# Patient Record
Sex: FEMALE | Race: WHITE | NOT HISPANIC OR LATINO | Employment: FULL TIME | URBAN - METROPOLITAN AREA
[De-identification: names, ages, dates, MRNs, and addresses within clinical notes are randomized per-mention and may not be internally consistent; named-entity substitution may affect disease eponyms.]

---

## 2017-02-03 ENCOUNTER — HISTORICAL (OUTPATIENT)
Dept: RADIOLOGY | Facility: HOSPITAL | Age: 57
End: 2017-02-03

## 2017-09-12 ENCOUNTER — HISTORICAL (OUTPATIENT)
Dept: RADIOLOGY | Facility: HOSPITAL | Age: 57
End: 2017-09-12

## 2018-09-27 ENCOUNTER — HISTORICAL (OUTPATIENT)
Dept: RADIOLOGY | Facility: HOSPITAL | Age: 58
End: 2018-09-27

## 2019-04-01 ENCOUNTER — HISTORICAL (OUTPATIENT)
Dept: LAB | Facility: HOSPITAL | Age: 59
End: 2019-04-01

## 2019-04-01 LAB
CHOLEST SERPL-MCNC: 166 MG/DL (ref 140–200)
CHOLEST/HDLC SERPL: 3 MG/DL (ref 0–8)
EST. AVERAGE GLUCOSE BLD GHB EST-MCNC: 137 MG/DL
FOLATE SERPL-MCNC: 18.9 NG/ML (ref 8.6–58.9)
HBA1C MFR BLD: 6.4 % (ref 4.8–6)
HDLC SERPL-MCNC: 50 MG/DL (ref 35–59)
LDLC SERPL CALC-MCNC: 107 MG/DL (ref 100–129)
TESTOST SERPL-MCNC: 31 NG/DL (ref 14–76)
TRIGL SERPL-MCNC: 111 MG/DL (ref 35–150)
TSH SERPL-ACNC: 2.13 MIU/ML (ref 0.36–3.74)
VIT B12 SERPL-MCNC: 558 PG/ML (ref 193–986)
VLDLC SERPL CALC-MCNC: 22 MG/DL

## 2019-11-11 ENCOUNTER — HISTORICAL (OUTPATIENT)
Dept: RADIOLOGY | Facility: HOSPITAL | Age: 59
End: 2019-11-11

## 2020-04-08 ENCOUNTER — HOSPITAL ENCOUNTER (OUTPATIENT)
Dept: MEDSURG UNIT | Facility: HOSPITAL | Age: 60
End: 2020-04-09
Attending: INTERNAL MEDICINE | Admitting: INTERNAL MEDICINE

## 2020-04-08 LAB
ABS NEUT (OLG): 2.29 X10(3)/MCL (ref 2.1–9.2)
ALBUMIN SERPL-MCNC: 4.3 GM/DL (ref 3.5–5)
ALBUMIN/GLOB SERPL: 1.2 RATIO (ref 1.1–2)
ALP SERPL-CCNC: 57 UNIT/L (ref 40–150)
ALT SERPL-CCNC: 44 UNIT/L (ref 0–55)
APPEARANCE, UA: CLEAR
AST SERPL-CCNC: 39 UNIT/L (ref 5–34)
BACTERIA SPEC CULT: NORMAL /HPF
BASOPHILS # BLD AUTO: 0 X10(3)/MCL (ref 0–0.2)
BASOPHILS NFR BLD AUTO: 1 %
BILIRUB SERPL-MCNC: 0.5 MG/DL
BILIRUB UR QL STRIP: NEGATIVE
BILIRUBIN DIRECT+TOT PNL SERPL-MCNC: 0.1 MG/DL (ref 0–0.5)
BILIRUBIN DIRECT+TOT PNL SERPL-MCNC: 0.4 MG/DL (ref 0–0.8)
BNP BLD-MCNC: <15 PG/ML (ref 0–100)
BUN SERPL-MCNC: 17 MG/DL (ref 9.8–20.1)
CALCIUM SERPL-MCNC: 9.2 MG/DL (ref 8.4–10.2)
CHLORIDE SERPL-SCNC: 102 MMOL/L (ref 98–107)
CO2 SERPL-SCNC: 27 MMOL/L (ref 22–29)
COLOR UR: YELLOW
CREAT SERPL-MCNC: 0.98 MG/DL (ref 0.55–1.02)
D DIMER PPP IA.FEU-MCNC: 348 NG/ML FEU (ref 0–500)
EOSINOPHIL # BLD AUTO: 0 X10(3)/MCL (ref 0–0.9)
EOSINOPHIL NFR BLD AUTO: 1 %
ERYTHROCYTE [DISTWIDTH] IN BLOOD BY AUTOMATED COUNT: 13.6 % (ref 11.5–17)
ERYTHROCYTE [SEDIMENTATION RATE] IN BLOOD: 2 MM/HR (ref 0–20)
GLOBULIN SER-MCNC: 3.6 GM/DL (ref 2.4–3.5)
GLUCOSE (UA): NEGATIVE
GLUCOSE SERPL-MCNC: 115 MG/DL (ref 74–100)
HCT VFR BLD AUTO: 49.6 % (ref 37–47)
HGB BLD-MCNC: 16.1 GM/DL (ref 12–16)
HGB UR QL STRIP: NEGATIVE
KETONES UR QL STRIP: NEGATIVE
LDH SERPL-CCNC: 386 UNIT/L (ref 140–271)
LEUKOCYTE ESTERASE UR QL STRIP: NEGATIVE
LYMPHOCYTES # BLD AUTO: 2 X10(3)/MCL (ref 0.6–4.6)
LYMPHOCYTES NFR BLD AUTO: 42 %
MAGNESIUM SERPL-MCNC: 1.96 MG/DL (ref 1.6–2.6)
MCH RBC QN AUTO: 31 PG (ref 27–31)
MCHC RBC AUTO-ENTMCNC: 32.5 GM/DL (ref 33–36)
MCV RBC AUTO: 95.6 FL (ref 80–94)
MONOCYTES # BLD AUTO: 0.3 X10(3)/MCL (ref 0.1–1.3)
MONOCYTES NFR BLD AUTO: 7 %
NEUTROPHILS # BLD AUTO: 2.29 X10(3)/MCL (ref 2.1–9.2)
NEUTROPHILS NFR BLD AUTO: 49 %
NITRITE UR QL STRIP: NEGATIVE
PH UR STRIP: 5 [PH] (ref 5–9)
PHOSPHATE SERPL-MCNC: 3.5 MG/DL (ref 2.3–4.7)
PLATELET # BLD AUTO: 237 X10(3)/MCL (ref 130–400)
PMV BLD AUTO: 11 FL (ref 9.4–12.4)
POC TROPONIN: 0.01 NG/ML (ref 0–0.08)
POTASSIUM SERPL-SCNC: 5 MMOL/L (ref 3.5–5.1)
PROT SERPL-MCNC: 7.9 GM/DL (ref 6.4–8.3)
PROT UR QL STRIP: NEGATIVE
RBC # BLD AUTO: 5.19 X10(6)/MCL (ref 4.2–5.4)
RBC #/AREA URNS HPF: NORMAL /[HPF]
SODIUM SERPL-SCNC: 139 MMOL/L (ref 136–145)
SP GR UR STRIP: 1.03 (ref 1–1.03)
SQUAMOUS EPITHELIAL, UA: NORMAL
TROPONIN I SERPL-MCNC: 0 NG/ML (ref 0–0.04)
UROBILINOGEN UR STRIP-ACNC: 0.2
WBC # SPEC AUTO: 4.7 X10(3)/MCL (ref 4.5–11.5)
WBC #/AREA URNS HPF: NORMAL /[HPF]

## 2020-04-09 LAB
ABS NEUT (OLG): 1.56 X10(3)/MCL (ref 2.1–9.2)
ALBUMIN SERPL-MCNC: 3.7 GM/DL (ref 3.5–5)
ALBUMIN/GLOB SERPL: 1.3 RATIO (ref 1.1–2)
ALP SERPL-CCNC: 46 UNIT/L (ref 40–150)
ALT SERPL-CCNC: 38 UNIT/L (ref 0–55)
AST SERPL-CCNC: 30 UNIT/L (ref 5–34)
BASOPHILS # BLD AUTO: 0 X10(3)/MCL (ref 0–0.2)
BASOPHILS NFR BLD AUTO: 0 %
BILIRUB SERPL-MCNC: 0.4 MG/DL
BILIRUBIN DIRECT+TOT PNL SERPL-MCNC: 0.2 MG/DL (ref 0–0.5)
BILIRUBIN DIRECT+TOT PNL SERPL-MCNC: 0.2 MG/DL (ref 0–0.8)
BUN SERPL-MCNC: 14 MG/DL (ref 9.8–20.1)
CALCIUM SERPL-MCNC: 8.4 MG/DL (ref 8.4–10.2)
CHLORIDE SERPL-SCNC: 105 MMOL/L (ref 98–107)
CO2 SERPL-SCNC: 27 MMOL/L (ref 22–29)
CREAT SERPL-MCNC: 0.79 MG/DL (ref 0.55–1.02)
EOSINOPHIL # BLD AUTO: 0 X10(3)/MCL (ref 0–0.9)
EOSINOPHIL NFR BLD AUTO: 1 %
ERYTHROCYTE [DISTWIDTH] IN BLOOD BY AUTOMATED COUNT: 13.6 % (ref 11.5–17)
GLOBULIN SER-MCNC: 2.8 GM/DL (ref 2.4–3.5)
GLUCOSE SERPL-MCNC: 111 MG/DL (ref 74–100)
HCT VFR BLD AUTO: 45.5 % (ref 37–47)
HGB BLD-MCNC: 14.6 GM/DL (ref 12–16)
LYMPHOCYTES # BLD AUTO: 2.2 X10(3)/MCL (ref 0.6–4.6)
LYMPHOCYTES NFR BLD AUTO: 53 %
MCH RBC QN AUTO: 30.6 PG (ref 27–31)
MCHC RBC AUTO-ENTMCNC: 32.1 GM/DL (ref 33–36)
MCV RBC AUTO: 95.4 FL (ref 80–94)
MONOCYTES # BLD AUTO: 0.4 X10(3)/MCL (ref 0.1–1.3)
MONOCYTES NFR BLD AUTO: 8 %
NEUTROPHILS # BLD AUTO: 1.56 X10(3)/MCL (ref 2.1–9.2)
NEUTROPHILS NFR BLD AUTO: 37 %
PLATELET # BLD AUTO: 186 X10(3)/MCL (ref 130–400)
PMV BLD AUTO: 11.2 FL (ref 9.4–12.4)
POTASSIUM SERPL-SCNC: 4.2 MMOL/L (ref 3.5–5.1)
PROT SERPL-MCNC: 6.5 GM/DL (ref 6.4–8.3)
RBC # BLD AUTO: 4.77 X10(6)/MCL (ref 4.2–5.4)
SODIUM SERPL-SCNC: 141 MMOL/L (ref 136–145)
WBC # SPEC AUTO: 4.2 X10(3)/MCL (ref 4.5–11.5)

## 2021-03-18 ENCOUNTER — HISTORICAL (OUTPATIENT)
Dept: RADIOLOGY | Facility: HOSPITAL | Age: 61
End: 2021-03-18

## 2021-05-31 ENCOUNTER — HISTORICAL (OUTPATIENT)
Dept: LAB | Facility: HOSPITAL | Age: 61
End: 2021-05-31

## 2021-05-31 LAB
ABS NEUT (OLG): 2.8 X10(3)/MCL (ref 1.5–6.9)
ALBUMIN SERPL-MCNC: 4 GM/DL (ref 3.5–5)
ALBUMIN/GLOB SERPL: 1.1 RATIO (ref 1.1–2)
ALP SERPL-CCNC: 60 UNIT/L (ref 40–150)
ALT SERPL-CCNC: 53 UNIT/L (ref 0–55)
AST SERPL-CCNC: 33 UNIT/L (ref 5–34)
BASOPHILS # BLD AUTO: 0 X10(3)/MCL (ref 0–0.1)
BASOPHILS NFR BLD AUTO: 1 % (ref 0–1)
BILIRUB SERPL-MCNC: 0.5 MG/DL
BILIRUBIN DIRECT+TOT PNL SERPL-MCNC: 0.2 MG/DL (ref 0–0.5)
BILIRUBIN DIRECT+TOT PNL SERPL-MCNC: 0.3 MG/DL (ref 0–0.8)
BUN SERPL-MCNC: 15 MG/DL (ref 9.8–20.1)
CALCIUM SERPL-MCNC: 9.8 MG/DL (ref 8.4–10.2)
CHLORIDE SERPL-SCNC: 101 MMOL/L (ref 98–107)
CHOLEST SERPL-MCNC: 174 MG/DL
CHOLEST/HDLC SERPL: 3 {RATIO} (ref 0–5)
CO2 SERPL-SCNC: 31 MMOL/L (ref 22–29)
CREAT SERPL-MCNC: 0.79 MG/DL (ref 0.55–1.02)
EOSINOPHIL # BLD AUTO: 0.1 X10(3)/MCL (ref 0–0.6)
EOSINOPHIL NFR BLD AUTO: 2 % (ref 0–5)
ERYTHROCYTE [DISTWIDTH] IN BLOOD BY AUTOMATED COUNT: 13.4 % (ref 11.5–17)
EST. AVERAGE GLUCOSE BLD GHB EST-MCNC: 145.6 MG/DL
GLOBULIN SER-MCNC: 3.5 GM/DL (ref 2.4–3.5)
GLUCOSE SERPL-MCNC: 154 MG/DL (ref 74–100)
HBA1C MFR BLD: 6.7 %
HCT VFR BLD AUTO: 46.1 % (ref 36–48)
HDLC SERPL-MCNC: 55 MG/DL (ref 35–60)
HGB BLD-MCNC: 15.2 GM/DL (ref 12–16)
IMM GRANULOCYTES # BLD AUTO: 0.02 10*3/UL (ref 0–0.02)
IMM GRANULOCYTES NFR BLD AUTO: 0.4 % (ref 0–0.43)
LDLC SERPL CALC-MCNC: 96 MG/DL (ref 50–140)
LYMPHOCYTES # BLD AUTO: 2.3 X10(3)/MCL (ref 0.5–4.1)
LYMPHOCYTES NFR BLD AUTO: 41 % (ref 15–40)
MCH RBC QN AUTO: 32 PG (ref 27–34)
MCHC RBC AUTO-ENTMCNC: 33 GM/DL (ref 31–36)
MCV RBC AUTO: 97 FL (ref 80–99)
MONOCYTES # BLD AUTO: 0.3 X10(3)/MCL (ref 0–1.1)
MONOCYTES NFR BLD AUTO: 6 % (ref 4–12)
NEUTROPHILS # BLD AUTO: 2.8 X10(3)/MCL (ref 1.5–6.9)
NEUTROPHILS NFR BLD AUTO: 50 % (ref 43–75)
PLATELET # BLD AUTO: 268 X10(3)/MCL (ref 140–400)
PMV BLD AUTO: 11 FL (ref 6.8–10)
POTASSIUM SERPL-SCNC: 4.6 MMOL/L (ref 3.5–5.1)
PROT SERPL-MCNC: 7.5 GM/DL (ref 6.4–8.3)
RBC # BLD AUTO: 4.75 X10(6)/MCL (ref 4.2–5.4)
SODIUM SERPL-SCNC: 139 MMOL/L (ref 136–145)
TRIGL SERPL-MCNC: 117 MG/DL (ref 37–140)
TSH SERPL-ACNC: 1.26 UIU/ML (ref 0.35–4.94)
VLDLC SERPL CALC-MCNC: 23 MG/DL
WBC # SPEC AUTO: 5.6 X10(3)/MCL (ref 4.5–11.5)

## 2022-04-30 NOTE — ED PROVIDER NOTES
Patient:   Dorina Osborne             MRN: [id number]            FIN: [id number]               Age:   59 years     Sex:  Female     :  1960   Associated Diagnoses:   COVID-19 virus infection; Ankylosing spondylitis; Hypoxemia requiring supplemental oxygen; Obesity; Peripheral neuropathy   Author:   Bubba JENNINGS, Rodney SANCHEZ      Basic Information   Time seen: Date & time 2020 17:36:00.   History source: Patient.   Arrival mode: Private vehicle, walking.   History limitation: None.   Additional information: Patient's physician(s): Mauro JENNINGS, Dorina Rhodes.      History of Present Illness   The patient presents with difficulty breathing, cough, wheezing, 60 yo female presents cough, congestion, and worsening SOB for 2 days. Complains of chest tighness. States has headache. COVID19 +. hx of ankylosing spondylitis. Sent by PCP Dorina Wade. TAMMY Atkins PA-C and     60 y/o white female with history of ankylosing spondylitis with secondary neuropathy presents to ED for worsening SOB, cough, chest tightness, and congestion x2 days, onset 2020. She reports symptoms of chest tightness, congestion, SOB with associated orthopnea, cough, nausea, headache, and fever. Pt prompted to visit by Dr. Wade. Pt received COVID 19 test yesterday which she reports came back positive today. Began Rx Zithromax yesterday. .  The onset was 2  days ago.  The course/duration of symptoms is constant and worsening.  Degree at onset moderate.  Degree at present moderate.  The Exacerbating factors is lying flat.  The Relieving factors is none.  Risk factors consist of none and not smoking.  Prior episodes: none.  Therapy today: none.  Associated symptoms: chest pain, fever, cough, nausea and headache.  Additional history: none.        Review of Systems   Constitutional symptoms:  Fever.   Skin symptoms:  Negative except as documented in HPI.   Eye symptoms:  Negative except as documented in HPI.   ENMT symptoms:  Nasal congestion.    Respiratory symptoms:  Shortness of breath, orthopnea, cough.    Cardiovascular symptoms:  Chest pain, tightness.    Gastrointestinal symptoms:  Nausea.   Genitourinary symptoms:  Negative except as documented in HPI.   Musculoskeletal symptoms:  Negative except as documented in HPI.   Neurologic symptoms:  Headache.   Psychiatric symptoms:  Negative except as documented in HPI.   Endocrine symptoms:  Negative except as documented in HPI.   Hematologic/Lymphatic symptoms:  Negative except as documented in HPI.   Allergy/immunologic symptoms:  Negative except as documented in HPI.             Additional review of systems information: All other systems reviewed and otherwise negative.      Health Status   Allergies:    Allergic Reactions (Selected)  Severity Not Documented  Amoxicillin- Unknown.  Amoxil- Unk.  Iodine- Unk..   Medications:  (Selected)   Inpatient Medications  Ordered  Normal Saline (0.9% NS) IV: 1,000 mL, 1,000 mL, IV, 1000 mL/hr, start date 20 17:40:00 CDT, stop date 20 17:40:00 CDT, STAT  Zofran 2 mg/mL injectable solution: 4 mg, form: Injection, IV Push, Once, first dose 20 17:40:00 CDT, stop date 20 17:40:00 CDT, STAT  Documented Medications  Documented  GABAPENTIN 300 MG CAPSULE:   .   Immunizations: Tetanus up to date, no influenza, no pneumococcal.   Menstrual history: , P: 1.      Past Medical/ Family/ Social History   Medical history:   Ankylosing spondylitis  Neuropathy.   Surgical history:   R ankle surgery x3  Hysterectomy  CHARLI Corrective surgery   Cholecystectomy  Tonsillectomy  Adenoidectomy.   Family history:   Mother: ; DM and CAD  Father: ; MI.   Social history: Alcohol use: Denies, Tobacco use: Denies, Drug use: Denies, Occupation: Employed, Family/social situation: , intact family.      Physical Examination               Vital Signs   Vital Signs   2020 17:34 CDT       Temperature Oral          37.3 DegC                              Temperature Oral (calculated)             99.14 DegF                             Peripheral Pulse Rate     91 bpm                             Respiratory Rate          18 br/min                             SpO2                      99 %                             Oxygen Therapy            Room air                             Systolic Blood Pressure   148 mmHg  HI                             Diastolic Blood Pressure  89 mmHg  .   Measurements   4/8/2020 17:34 CDT       Weight Dosing             111 kg                             Weight Measured and Calculated in Lbs     244.71 lb                             Weight Estimated          111 kg                             Height/Length Dosing      165.1 cm                             Height/Length Estimated   165.1 cm                             Body Mass Index Estimated 40.72 kg/m2  .   Basic Oxygen Information   4/8/2020 17:34 CDT       SpO2                      99 %                             Oxygen Therapy            Room air  .   General:  Alert, mild distress, Obese   , white female, Uncomfortable appearing, not anxious, not ill-appearing.    Skin:  Warm, dry, no rash, normal for ethnicity   Head:  Normocephalic, atraumatic   Neck:  Supple, trachea midline, no tenderness, no JVD, no carotid bruit.    Eye:  Pupils are equal, round and reactive to light, extraocular movements are intact, normal conjunctiva   Ears, nose, mouth and throat:  Tympanic membranes clear, oral mucosa moist, no pharyngeal erythema or exudate.    Cardiovascular:  Regular rate and rhythm, No murmur, Normal peripheral perfusion, No edema.    Respiratory:  Respirations are non-labored, breath sounds are equal, Breath sounds: Bilateral, diminished, no crackles present.    Chest wall:  No tenderness, No deformity.    Back:  Nontender, Normal range of motion, Normal alignment, no step-offs.    Musculoskeletal:  Normal ROM, normal strength, no tenderness, no swelling, no deformity.     Gastrointestinal:  Soft, Nontender, Non distended, Normal bowel sounds, No organomegaly, Obese   Genitourinary:  no CVA tenderness, no CVA tenderness   Neurological:  Alert and oriented to person, place, time, and situation, No focal neurological deficit observed, CN II-XII intact, normal sensory observed, normal motor observed, normal speech observed, normal coordination observed.    Lymphatics:  No lymphadenopathy.   Psychiatric:  Cooperative, appropriate mood & affect, normal judgment, non-suicidal.       Medical Decision Making   Differential Diagnosis:  Pneumonia, bronchitis, congestive heart failure, chronic obstructive pulmonary disease.    Documents reviewed:  Emergency department nurses' notes.   Orders  Launch Orders   Laboratory:  Urinalysis with Reflex (Order): Stat collect, Urine, 4/8/2020 17:40 CDT, Nurse collect, Print Label By Order Location  Magnesium Level (Order): Stat collect, 4/8/2020 17:40 CDT, Blood, Lab Collect, Print Label By Order Location, 4/8/2020 17:40 CDT  POC BNP iSTAT request (Order): Blood, Routine collect, 4/8/2020 17:39 CDT by Sherry Guillaume, Lab Collect, Print Label By Order Location  POC iSTAT ER Troponin request (Order): Blood, Stat collect, 4/8/2020 17:39 CDT by Sherry Guillaume Lab Collect, Print Label By Order Location  Troponin-I (Order): Stat collect, 4/8/2020 17:39 CDT, Blood, Lab Collect, Print Label By Order Location, 4/8/2020 17:39 CDT  CMP (Order): Stat collect, 4/8/2020 17:39 CDT, Blood, Lab Collect, Print Label By Order Location, 4/8/2020 17:39 CDT  CBC w/ Auto Diff (Order): Now collect, 4/8/2020 17:39 CDT, Blood, Lab Collect, Print Label By Order Location, 4/8/2020 17:39 CDT  Pharmacy:  Zofran 2 mg/mL injectable solution (Order): 4 mg, form: Injection, IV Push, Once, first dose 4/8/2020 17:40 CDT, stop date 4/8/2020 17:40 CDT, STAT  Sodium Chloride 0.9% intravenous solution (Normal Saline (0.9% NS) IV) (Order): 1,000 mL, 1,000 mL, IV, 1,000 mL/hr, start  date 2020 17:40 CDT, stop date 2020 17:40 CDT  Radiology:  XR Chest 1 View (Order): Stat, 2020 17:39 CDT, Chest Pain, None, Stretcher, Rad Type, Not Scheduled  Cardiology:  EKG (Order): 2020 17:39 CDT, Stat, Once, 2020 17:39 CDT, -1, -1, 2020 17:39 CDT, launch Order Profile (Selected)   Inpatient Orders  Ordered  EK20 17:39:00 CDT, Stat, Once, 20 17:39:00 CDT, -1, -1, 20 17:39:00 CDT  Normal Saline (0.9% NS) IV: 1,000 mL, 1,000 mL, IV, 1000 mL/hr, start date 20 17:40:00 CDT, stop date 20 17:40:00 CDT, STAT  Patient Isolation: 20 17:41:53 CDT, Contact Precautions, Constant Indicator  Patient Isolation: 20 17:41:53 CDT, Droplet Precautions, Constant Indicator  Zofran 2 mg/mL injectable solution: 4 mg, form: Injection, IV Push, Once, first dose 20 17:40:00 CDT, stop date 20 17:40:00 CDT, STAT  Ordered (Collected)  POC BNP iSTAT request: BLOOD, ROUTINE collect, Collected, 20 18:01:22 CDT, Stop date 20 18:01:00 CDT, Lab Collect, Print Label By Order Location  POC iSTAT ER Troponin request: BLOOD, STAT collect, Collected, 20 18:01:22 CDT, Stop date 20 18:01:00 CDT, Lab Collect, Print Label By Order Location  Ordered (Dispatched)  Urinalysis with Reflex: Stat collect, Urine, 20 17:40:00 CDT, Stop date 20 17:40:00 CDT, Nurse collect, Print Label By Order Location  Ordered (Exam Started)  XR Chest 1 View: Stat, 20 17:39:00 CDT, Chest Pain, None, Stretcher, Rad Type, Not Scheduled, 20 17:39:00 CDT  Ordered (In-Lab)  Automated Diff: NOW collect, 20 18:01:00 CDT, Blood, Collected, Stop date 20 18:01:00 CDT, Lab Collect, Print Label By Order Location, 20 17:39:00 CDT  CBC w/ Auto Diff: NOW collect, 20 18:01:22 CDT, BLOOD, Collected, Stop date 20 18:01:00 CDT, Lab Collect  CMP: STAT collect, 20 18:01:22 CDT, BLOOD, Collected, Stop date 20 18:01:00 CDT, Lab  Collect  Magnesium Level: STAT collect, 04/08/20 18:01:22 CDT, BLOOD, Collected, Stop date 04/08/20 18:01:00 CDT, Lab Collect  Troponin-I: STAT collect, 04/08/20 18:01:22 CDT, BLOOD, Collected, Stop date 04/08/20 17:40:00 CDT, Lab Collect  .    Electrocardiogram:  Time 4/8/2020 17:43:00, rate 93, normal sinus rhythm, No ST-T changes, no ectopy, normal SC & QRS intervals, EP Interp, Minimum voltage changes for LVH.    Results review:  Lab results : Lab View   4/8/2020 18:25 CDT       POC Troponin              0.01 ng/mL    4/8/2020 18:23 CDT       POC BNP iSTAT             <15 pg/mL    4/8/2020 18:01 CDT       Sodium Lvl                139 mmol/L                             Potassium Lvl             5.0 mmol/L                             Chloride                  102 mmol/L                             CO2                       27 mmol/L                             Calcium Lvl               9.2 mg/dL                             Magnesium Lvl             1.96 mg/dL                             Glucose Lvl               115 mg/dL  HI                             BUN                       17.0 mg/dL                             Creatinine                0.98 mg/dL                             eGFR-AA                   75  NA                             eGFR-CATHLEEN                  62  NA                             Bili Total                0.5 mg/dL                             Bili Direct               0.1 mg/dL                             Bili Indirect             0.40 mg/dL                             AST                       39 unit/L  HI                             ALT                       44 unit/L                             Alk Phos                  57 unit/L                             Total Protein             7.9 gm/dL                             Albumin Lvl               4.3 gm/dL                             Globulin                  3.6 gm/dL  HI                             A/G Ratio                 1.2 ratio                              Troponin-I                0.000 ng/mL                             WBC                       4.7 x10(3)/mcL                             RBC                       5.19 x10(6)/mcL                             Hgb                       16.1 gm/dL  HI                             Hct                       49.6 %  HI                             Platelet                  237 x10(3)/mcL                             MCV                       95.6 fL  HI                             MCH                       31.0 pg                             MCHC                      32.5 gm/dL  LOW                             RDW                       13.6 %                             MPV                       11.0 fL                             Abs Neut                  2.29 x10(3)/mcL                             Neutro Auto               49 %  NA                             Lymph Auto                42 %  NA                             Mono Auto                 7 %  NA                             Eos Auto                  1 %  NA                             Abs Eos                   0.0 x10(3)/mcL                             Basophil Auto             1 %  NA                             Abs Neutro                2.29 x10(3)/mcL                             Abs Lymph                 2.0 x10(3)/mcL                             Abs Mono                  0.3 x10(3)/mcL                             Abs Baso                  0.0 x10(3)/mcL    .   Radiology results:  Rad Results (ST)  < 12 hrs   Accession: [id number]  Order: XR Chest 1 View  Report Dt/Tm: 04/08/2020 18:55  Report:   EXAMINATION  XR Chest 1 View     INDICATION  Chest Pain     Comparison: 3 February, 2017     FINDINGS  Lines/tubes/devices:  None present.     The cardiomediastinal silhouette and central pulmonary vasculature are  unremarkable for AP projection.  The trachea is midline. There is no lobar consolidation or significant  pleural effusion. No definite  pneumothorax is appreciated.     There is no acute osseous or extrathoracic abnormality.     IMPRESSION  No focal intrathoracic process.      .      Reexamination/ Reevaluation   Time: 4/8/2020 19:34:00 .   Interventions: Patient sat was excellent and she arrived and when I 1st interviewed her it was in the 96 9799 range unfortunately now it is down at 92-93 breathing room air with her cloth mask on.  I am concerned that with her ankylosing spondylitis that she has her every 3 weeks biological injection.  That she isn't high risk for rapid deterioration I'm obtaining a consultation with the hospitalist service regarding admission I will order the intravenous Rocephin and Zosyn intravenous and she has a normal QT interval on her EKG.      Procedure   Critical care note   Total time: 66 minutes spent engaged in work directly related to patient care and/ or available for direct patient care.   Critical condition(s) addressed for impending deterioration include: respiratory, cardiovascular.   Associated risk factors: hypoxia.   Management: bedside assessment, supervision of care, Interpretation (chest x-ray, electrocardiogram), Interventions Call for positive with shortness of breath high risk for rapid deterioration with her ankylosing spondylitis and biological treatment for same, Case review (medical specialist, nursing).   Performed by: self.      Impression and Plan   Diagnosis   COVID-19 virus infection (PHQ49-JU U07.1)   Ankylosing spondylitis (QZO80-ZO M45.9)   Hypoxemia requiring supplemental oxygen (AEO50-PH R09.02)   Obesity (DBK94-IX E66.9)   Peripheral neuropathy (CCP17-NV G62.9)      Calls-Consults   -  4/8/2020 19:38:00 , Beto JENNINGS, Irvin PÉREZ, Hospitalist service, spoke to midlevel.    Plan   Condition: Unchanged.    Disposition: Admit time  4/8/2020 19:39:00, Admit to Inpatient Unit.    Counseled: Patient, Regarding diagnosis, Regarding diagnostic results, Regarding treatment plan, Patient indicated  understanding of instructions.    Notes: I, Hanny Jamison, acted solely as a scribe for and in the presence of Dr. Mac who performed the service. , I, Rodney Mac MD, a physician licensed to practice in this state, have  performed the physical evaluation,  history gathering,  and medical decision making that is reflected in this record..   ANGELINA Mac MD.

## 2022-04-30 NOTE — DISCHARGE SUMMARY
Patient:   Dorina Osborne             MRN: [id number]            FIN: [id number]               Age:   59 years     Sex:  Female     :  1960   Associated Diagnoses:   None   Author:   Ashwin Vargas MD      Discharge Information      Discharge Summary Information   Admit/Discharge Dates   Admit Date: 2020  Discharge Date: 2020     Physicians   Attending Physician - Beto JENNINGS, Irvin PÉREZ  Admitting Physician - Beto JENNINGS, Irvin PÉREZ  Consulting Physician - Sam JENNINGS, Ashwin  Primary Care Physician - Dorina Wade MD     Discharge Diagnosis   COVID-19 virus infection U07.1   Hypoxemia requiring supplemental oxygen R09.02   Ankylosing spondylitis on immunomodulator/methotrexate  Peripheral neuropathy       Procedures   No procedures recorded for this visit.   Immunizations   No immunizations recorded for this visit.     Discharge Medications   Prescribed  albuterol (ProAir HFA 90 mcg/inh inhalation aerosol with adapter) 180 mcg, INH, q4hr, PRN shortness of breath or wheezing  azithromycin (Zithromax 250 mg oral tablet) 250 mg, Oral, Daily  cholecalciferol (Vitamin D3 5000 intl units (125 mcg) oral tablet) 5,000 IntUnit, Oral, Daily  hydroxychloroquine (hydroxychloroquine 200 mg oral tablet) 200 mg, Oral, BID  zinc sulfate (zinc sulfate 220 mg oral capsule) 220 mg, Oral, Daily  Continue  folic acid (folic acid 1 mg oral tablet) 1 mg, Oral, Daily  gabapentin (GABAPENTIN 300 MG CAPSULE)  Discontinue  methotrexate (methotrexate 2.5 mg oral tablet) 7.5 mg, Oral, qWeek        Education   Discharge - 20 13:40:00 CDT, Home, Give all scheduled vaccinations prior to discharge.   Discharge Activity - Activity as Tolerated   Discharge Diet - Regular         Followup   Dorina Wade, within 7 to 10 days        Hospital Course   Hospital Course   Patient 59-year-old female past medical history of ankylosing spondylitis (reportedly on immunomodulators injection every 3  weeks) and peripheral neuropathy who presented to the ER for worsening cough and shortness of breath. Patient reported her primary care physician tested her based on her symptoms for coronavirus which she reports came back positive today and was thus referred to the ER. She was afebrile on arrival satting 99% on room air. Laboratory work was unremarkable. Chest x-ray was unremarkable. However in the ER she started to desat down to the lower 90s, so hospitalist service was asked to admit the patient for hypoxia. Pt had EKG done and was unremarkable. She was started on IV Rocephin, Zithromax and po Plaquenil. Her O2 sats stabilized. She had no acute issues overnight. She denied fever, chills, myalgia or SOB. She had mild occ dry cough. She is feeling good since admit and ready to go home. Will be discharged with clear instructions on self isolation.   Explained in detail to patient and family about the discharge plan, medications and F/U visits. They understand agree with the treatment plan.   Condition stable  Diet: regular   Meds per dc med rec  Activities as tolerated   F/U with PCP in 1 to 2 weeks  For further questions contact hospitalist office  DC 31 mts   .        Physical Examination   General:  Alert and oriented, Obese. Pt is COVID positive, so did a focused/limited physical exam. She is oriented x 3 and following all verbal commands. She is in NO respiratory distress. .    Respiratory:  Respirations are non-labored.    Neurologic:  No focal deficits, Cranial Nerves II-XII are grossly intact.       Discharge Plan   Education and Follow-up   Counseled: patient, regarding diagnosis, regarding treatment, regarding medications.

## 2022-05-04 NOTE — HISTORICAL [facility]ER
This is a historical note converted from Rui. Formatting and pictures may have been removed.  Please reference Rui for original formatting and attached multimedia. Chief Complaint  PT to ed via POV. Sent from Dr. Wade for cough, SOB, CP, nausea, and headaches x2 days. Also reports fevers. +COVID test. Immunocomprimised. Hx: Arthritis and vertigo  History of Present Illness  Patient 59-year-old female past medical history of ankylosing spondylitis (reportedly on?immunomodulators injection every 3 weeks) and peripheral neuropathy who presented to the ER for worsening cough and shortness of breath.?? Patient reported her primary care physician tested her based on her symptoms for coronavirus which she reports came back positive today and was thus referred to the ER.? She was afebrile on arrival satting 99% on room air.? Laboratory work was unremarkable.? Chest x-ray was unremarkable.? However in the ER she started to desat down to the lower 90s, so hospitalist service was asked to admit the patient for hypoxia.  Review of Systems  General_negative except as stated above  HEENT_negative except as stated above  Neck_negative except as stated above  Respiratory_negative except as stated above  Cardiovascular_negative except as stated above  Gastrointestinal_negative except as stated above  Genitourinary_negative except as stated above  Musculoskeletal_negative except as stated above  Immunologic_negative except as stated above  Neurologic_negative except as stated above  Psychiatric_negative except as stated above  Physical Exam  Vitals & Measurements  T:?37.3? ?C (Oral)? HR:?79(Peripheral)? HR:?78(Monitored)? RR:?16? BP:?140/77? SpO2:?98%? WT:?111?kg?  GENERAL: awake, alert, oriented and in no acute distress  HEENT: normocephalic atraumatic?  NECK: supple?  LUNGS: CTA B/L, no rales or rhonchi, moving air well without resp distress  CVS: Regular rate and rhythm, normal peripheral perfusion  ABD: Soft, non-tender,  non-distended, bowel sounds present  EXTREMITIES: no clubbing or cyanosis  SKIN: Warm, dry.?  NEURO: alert and oriented, grossly without focal deficits?  PSYCHIATRIC: Cooperative  ?  Assessment/Plan  COVID-19 virus infection?U07.1  Hypoxemia requiring supplemental oxygen?R09.02  Ankylosing spondylitis on immunomodulator/methotrexate  Peripheral neuropathy  ?  - Continue appropriate isolation/precautions  - ceftriaxone/azithromycin  - start Plaquenil as QTC is within normal limits at 446, no signs of heart failure; patient is on methotrexate which in combination with hydroxychloroquine may increased risk of myelosuppression, will temporarily suspend methotrexate.? There is no drug-drug interaction with Simbrennen Lyncha which she last received a dose of in march.  - Inhalers as needed  - Wean oxygen as tolerated  -?obtain positive covid-19 results from patients PCP for records  ?   DVT prophylaxis: lovenox  CODE STATUS: Full code  PCP:?Mauro JENNINGS   Problem List/Past Medical History  Ankylosing spondylitis- on immunomodulators  Peripheral neuropathy  Procedure/Surgical History  R ankle surgery x3  Hysterectomy  CHARLI Corrective surgery?  Cholecystectomy  Tonsillectomy  Adenoidectomy.  Medications  Inpatient  acetaminophen, 650 mg= 20.3 mL, Oral, q6hr, PRN  acetaminophen, 1000 mg= 2 tab(s), Oral, q6hr, PRN  ceftriaxone (for IVPB) + sodium chloride 0.9% IVPB-diluent 100 mL  ProAir HFA 90 mcg/inh inhalation aerosol with adapter, 180 mcg= 2 puff(s), INH, q4hr, PRN  Protonix, 40 mg= 1 tab(s), Oral, Daily  Zithromax 500 mg/D5W 250 mL IVPB  Home  GABAPENTIN 300 MG CAPSULE  Allergies  Amoxil?(unk)  Mobic?(Irregular heart beat)  amoxicillin?(unknown)  iodine?(unk)  Social History  Abuse/Neglect  No, 04/08/2020  Tobacco  Never (less than 100 in lifetime), No, 04/08/2020  Immunizations  Vaccine Date Status   tetanus/diphtheria/pertussis, acel(Tdap) 10/05/2017 Given   Lab Results  Chemistry? Hematology/Coagulation?   Sodium Lvl: 139  mmol/L (04/08/20 18:01:00) D-Dimer: 348 ng/ml FEU (04/08/20 18:01:00)   Potassium Lvl: 5 mmol/L (04/08/20 18:01:00) WBC: 4.7 x10(3)/mcL (04/08/20 18:01:00)   Chloride: 102 mmol/L (04/08/20 18:01:00) RBC: 5.19 x10(6)/mcL (04/08/20 18:01:00)   CO2: 27 mmol/L (04/08/20 18:01:00) Hgb:?16.1 gm/dL?High (04/08/20 18:01:00)   Calcium Lvl: 9.2 mg/dL (04/08/20 18:01:00) Hct:?49.6 %?High (04/08/20 18:01:00)   Magnesium Lvl: 1.96 mg/dL (04/08/20 18:01:00) Platelet: 237 x10(3)/mcL (04/08/20 18:01:00)   Glucose Lvl:?115 mg/dL?High (04/08/20 18:01:00) MCV:?95.6 fL?High (04/08/20 18:01:00)   BUN: 17 mg/dL (04/08/20 18:01:00) MCH: 31 pg (04/08/20 18:01:00)   Creatinine: 0.98 mg/dL (04/08/20 18:01:00) MCHC:?32.5 gm/dL?Low (04/08/20 18:01:00)   eGFR-AA: 75 (04/08/20 18:01:00) RDW: 13.6 % (04/08/20 18:01:00)   eGFR-CATHLEEN: 62 (04/08/20 18:01:00) MPV: 11 fL (04/08/20 18:01:00)   Bili Total: 0.5 mg/dL (04/08/20 18:01:00) Abs Neut: 2.29 x10(3)/mcL (04/08/20 18:01:00)   Bili Direct: 0.1 mg/dL (04/08/20 18:01:00) Neutro Auto: 49 % (04/08/20 18:01:00)   Bili Indirect: 0.4 mg/dL (04/08/20 18:01:00) Lymph Auto: 42 % (04/08/20 18:01:00)   AST:?39 unit/L?High (04/08/20 18:01:00) Mono Auto: 7 % (04/08/20 18:01:00)   ALT: 44 unit/L (04/08/20 18:01:00) Eos Auto: 1 % (04/08/20 18:01:00)   Alk Phos: 57 unit/L (04/08/20 18:01:00) Abs Eos: 0 x10(3)/mcL (04/08/20 18:01:00)   Total Protein: 7.9 gm/dL (04/08/20 18:01:00) Basophil Auto: 1 % (04/08/20 18:01:00)   Albumin Lvl: 4.3 gm/dL (04/08/20 18:01:00) Abs Neutro: 2.29 x10(3)/mcL (04/08/20 18:01:00)   Globulin:?3.6 gm/dL?High (04/08/20 18:01:00) Abs Lymph: 2 x10(3)/mcL (04/08/20 18:01:00)   A/G Ratio: 1.2 ratio (04/08/20 18:01:00) Abs Mono: 0.3 x10(3)/mcL (04/08/20 18:01:00)   Phosphorus: 3.5 mg/dL (04/08/20 18:01:00) Abs Baso: 0 x10(3)/mcL (04/08/20 18:01:00)   LDH:?386 unit/L?High (04/08/20 18:01:00) Sed Rate: 2 mm/hr (04/08/20 18:01:00)   POC BNP iSTAT: <15 (04/08/20 18:23:00)    Troponin-I: 0 ng/mL  (04/08/20 18:01:00)    POC Troponin: 0.01 ng/mL (04/08/20 18:25:00)    ?  Diagnostic Results  Accession:?[id number]  Order:?XR Chest 1 View  Report Dt/Tm:?04/08/2020 18:55  Report:?  EXAMINATION  XR Chest 1 View  ?  INDICATION  Chest Pain  ?  Comparison: 3 February, 2017  ?  FINDINGS  Lines/tubes/devices:  None present.  ?  The cardiomediastinal silhouette and central pulmonary vasculature are  unremarkable for AP projection.  The trachea is midline. There is no lobar consolidation or significant  pleural effusion. No definite pneumothorax is appreciated.  ?  There is no acute osseous or extrathoracic abnormality.  ?  IMPRESSION  No focal intrathoracic process.  ?

## 2022-11-29 ENCOUNTER — HOSPITAL ENCOUNTER (OUTPATIENT)
Dept: RADIOLOGY | Facility: HOSPITAL | Age: 62
Discharge: HOME | End: 2022-11-29
Attending: FAMILY MEDICINE
Payer: COMMERCIAL

## 2022-11-29 DIAGNOSIS — Z12.31 ENCOUNTER FOR SCREENING MAMMOGRAM FOR BREAST CANCER: ICD-10-CM

## 2022-11-29 PROCEDURE — 77067 SCR MAMMO BI INCL CAD: CPT | Mod: 26,,, | Performed by: STUDENT IN AN ORGANIZED HEALTH CARE EDUCATION/TRAINING PROGRAM

## 2022-11-29 PROCEDURE — 77067 MAMMO DIGITAL SCREENING BILAT WITH TOMO: ICD-10-PCS | Mod: 26,,, | Performed by: STUDENT IN AN ORGANIZED HEALTH CARE EDUCATION/TRAINING PROGRAM

## 2022-11-29 PROCEDURE — 77063 BREAST TOMOSYNTHESIS BI: CPT | Mod: TC

## 2022-11-29 PROCEDURE — 77063 MAMMO DIGITAL SCREENING BILAT WITH TOMO: ICD-10-PCS | Mod: 26,,, | Performed by: STUDENT IN AN ORGANIZED HEALTH CARE EDUCATION/TRAINING PROGRAM

## 2022-11-29 PROCEDURE — 77063 BREAST TOMOSYNTHESIS BI: CPT | Mod: 26,,, | Performed by: STUDENT IN AN ORGANIZED HEALTH CARE EDUCATION/TRAINING PROGRAM

## 2023-06-01 DIAGNOSIS — Z12.31 OTHER SCREENING MAMMOGRAM: Primary | ICD-10-CM

## 2024-01-16 ENCOUNTER — HOSPITAL ENCOUNTER (OUTPATIENT)
Dept: RADIOLOGY | Facility: HOSPITAL | Age: 64
Discharge: HOME | End: 2024-01-16
Attending: FAMILY MEDICINE
Payer: COMMERCIAL

## 2024-01-16 DIAGNOSIS — Z12.31 OTHER SCREENING MAMMOGRAM: ICD-10-CM

## 2024-01-16 PROCEDURE — 77067 SCR MAMMO BI INCL CAD: CPT | Mod: 26,,, | Performed by: STUDENT IN AN ORGANIZED HEALTH CARE EDUCATION/TRAINING PROGRAM

## 2024-01-16 PROCEDURE — 77067 SCR MAMMO BI INCL CAD: CPT | Mod: TC

## 2024-01-16 PROCEDURE — 77063 BREAST TOMOSYNTHESIS BI: CPT | Mod: 26,,, | Performed by: STUDENT IN AN ORGANIZED HEALTH CARE EDUCATION/TRAINING PROGRAM

## 2024-05-29 ENCOUNTER — HOSPITAL ENCOUNTER (OUTPATIENT)
Dept: RADIOLOGY | Facility: HOSPITAL | Age: 64
Discharge: HOME | End: 2024-05-29
Attending: FAMILY MEDICINE
Payer: COMMERCIAL

## 2024-05-29 DIAGNOSIS — M79.605 LEFT LEG PAIN: ICD-10-CM

## 2024-05-29 PROCEDURE — 93971 EXTREMITY STUDY: CPT | Mod: TC,LT

## 2025-08-05 ENCOUNTER — HOSPITAL ENCOUNTER (OUTPATIENT)
Dept: WOUND CARE | Facility: HOSPITAL | Age: 65
Discharge: HOME | End: 2025-08-05
Attending: NURSE PRACTITIONER
Payer: COMMERCIAL

## 2025-08-05 VITALS
DIASTOLIC BLOOD PRESSURE: 66 MMHG | HEIGHT: 65 IN | RESPIRATION RATE: 18 BRPM | WEIGHT: 200 LBS | HEART RATE: 73 BPM | SYSTOLIC BLOOD PRESSURE: 145 MMHG | BODY MASS INDEX: 33.32 KG/M2

## 2025-08-05 DIAGNOSIS — S81.802A TRAUMATIC OPEN WOUND OF LEFT LOWER LEG, INITIAL ENCOUNTER: Primary | ICD-10-CM

## 2025-08-05 PROCEDURE — 99203 OFFICE O/P NEW LOW 30 MIN: CPT | Mod: 25

## 2025-08-05 PROCEDURE — 97597 DBRDMT OPN WND 1ST 20 CM/<: CPT

## 2025-08-05 PROCEDURE — 27000999 HC MEDICAL RECORD PHOTO DOCUMENTATION

## 2025-08-05 RX ORDER — TIRZEPATIDE 10 MG/.5ML
INJECTION, SOLUTION SUBCUTANEOUS
COMMUNITY

## 2025-08-05 RX ORDER — SOLIFENACIN SUCCINATE 10 MG/1
10 TABLET, FILM COATED ORAL EVERY MORNING
COMMUNITY

## 2025-08-05 RX ORDER — FLUTICASONE PROPIONATE 50 MCG
2 SPRAY, SUSPENSION (ML) NASAL
COMMUNITY
Start: 2025-04-28

## 2025-08-05 RX ORDER — SECUKINUMAB 300 MG/2ML
INJECTION SUBCUTANEOUS
COMMUNITY
Start: 2025-03-05

## 2025-08-05 RX ORDER — FOLIC ACID 1 MG/1
1000 TABLET ORAL
COMMUNITY

## 2025-08-05 RX ORDER — FUROSEMIDE 20 MG/1
20 TABLET ORAL
COMMUNITY
Start: 2025-06-03

## 2025-08-05 RX ORDER — ALBUTEROL SULFATE 90 UG/1
INHALANT RESPIRATORY (INHALATION) EVERY 4 HOURS PRN
COMMUNITY
Start: 2025-04-28

## 2025-08-05 RX ORDER — ALBUTEROL SULFATE 0.83 MG/ML
SOLUTION RESPIRATORY (INHALATION) EVERY 4 HOURS PRN
COMMUNITY
Start: 2025-04-28

## 2025-08-05 RX ORDER — CETIRIZINE HYDROCHLORIDE 10 MG/1
10 TABLET ORAL
COMMUNITY
Start: 2025-04-28

## 2025-08-05 RX ORDER — DICLOFENAC SODIUM 75 MG/1
75 TABLET, DELAYED RELEASE ORAL 2 TIMES DAILY
COMMUNITY

## 2025-08-05 RX ORDER — ESTRADIOL 0.1 MG/G
0.5 CREAM VAGINAL
COMMUNITY
Start: 2025-03-31 | End: 2028-07-03

## 2025-08-05 RX ORDER — GABAPENTIN 300 MG/1
600 CAPSULE ORAL
COMMUNITY

## 2025-08-05 NOTE — PROGRESS NOTES
Home Health: n/a  Smoker  [] Yes  [x] No   Last A1C: 5.6 on 25  Last CB on 25  Florence Jamaal  [x] Yes [] No            Results:  on 25  Doppler done in office? [x] Yes [] No   Date: 25     Right dorsalis: n/a     Right posterior: n/a     Left dorsalis: biphasic (abnormal)     Left posterior: biphasic (abnormal)  Darco Shoe [] Yes [x] No   Date given:   Is patient eligible for HBO [] Yes [x] No   Start date:   Is the patient eligible for a graft, and/or currently grafting?  [] Yes [] No    If so, which one/size?        Subjective:       Patient ID: Dorina Osborne is a 65 y.o. female.    Chief Complaint: Wound Care (Left lower anterior leg - traumatic)    HPI  25 - Ms. Osborne is a 65 year old  female who was referred to wound clinic by her PCP's office (Dr. Dorina Wade) for a traumatic non-healing wound to the left lower anterior leg.    The patient reports that approximately one month ago she fell while carrying a large board and it hit her on the left leg.  She was seen at [facility] in [place] a few days later.   They did x-rays and US for DVT (which were negative) and sent her home on Clindamycin.   She took Cindamycin for about 4 days but it upset her stomach so she stopped taking it.   She was still having issues weeks after and was seen by PCP .  At that visit an I&D was done for removal of a hematoma.   Her PCP ordered packing with iodoform packing strip and prescribed Doxy x7 days.  She has completed the abx.      That wound was assessed today and was found to be clean, with no S * S of infection.  There is a small deep area that does palpate to bone.  Because of bone exposure an x-ray has been ordered to ensure that there has not been development of osteomyelitis.  She reports that several years ago she saw a cardiologist but has had no follow up.  She has no vascular history and denies vascular testing. Her pulses were biphasic but were abnormal  at the left DP and PT. Because of the chronicity of this wound, she is scheduled for Venous/arterial US ordered for 8/11/25 at 12:00.  A culture was obtained to determine if any additional abx would be beneficial.    She is a Type 2 diabetic.  Her last A1C was 5.6 05/27/25. Fasting blood sugar to day was 77.  5/5 semmes milan.    We will begin today using collagen at the base of the wound with the goal of quick development of granular tissue.  We will continue to pack lightly with packing strips.  She will change that every other day.       Review of Systems      Objective:      Vitals:    08/05/25 1339   BP: (!) 145/66   Pulse: 73   Resp: 18       Physical Exam       Wound 08/05/25 1343 Traumatic Left lower;anterior Leg #1 (Active)   08/05/25 1343 Leg   Present on Original Admission: Y   Primary Wound Type: Traumatic   Side: Left   Orientation: lower;anterior   Wound Approximate Age at First Assessment (Weeks):    Wound Number: #1   Is this injury device related?:    Incision Type:    Closure Method:    Wound Description (Comments):    Type:    Additional Comments:    Ankle-Brachial Index:    Pulses:    Removal Indication and Assessment:    Wound Outcome:    Dressing Appearance Moist drainage 08/05/25 1342   Drainage Amount Moderate 08/05/25 1342   Drainage Characteristics/Odor Serosanguineous 08/05/25 1342   Appearance Red;Moist;Granulating 08/05/25 1342   Tissue loss description Full thickness 08/05/25 1342   Periwound Area Dry;Edematous 08/05/25 1342   Wound Edges Open 08/05/25 1342   Wound Length (cm) 1.5 cm 08/05/25 1342   Wound Width (cm) 1.5 cm 08/05/25 1342   Wound Depth (cm) 1 cm 08/05/25 1342   Wound Volume (cm^3) 1.178 cm^3 08/05/25 1342   Wound Surface Area (cm^2) 1.77 cm^2 08/05/25 1342   Supply Surface Area (L x W) 2.25 sq cm 08/05/25 1342   Care Cleansed with:;Wound cleanser 08/05/25 1342   Dressing Applied 08/05/25 1342     8/5/25          Left lower anterior leg (pre)                             "       Left lower anterior leg (post)  (Collagen, 1/4" packing strip, silver alginate, island dressing, tubigrip)        Assessment:         ICD-10-CM ICD-9-CM   1. Traumatic open wound of left lower leg, initial encounter  S81.802A 891.0           Procedures:     Debridement     Date/Time: 8/5/2025 1:20 PM     Performed by: Cheryl Wagner NP  Authorized by: Cheryl Wagner NP    Time out: Immediately prior to procedure a "time out" was called to verify the correct patient, procedure, equipment, support staff and site/side marked as required.        Preparation: Patient was prepped and draped with aseptic technique    Local anesthesia used?: No       Wound Details:    Location:  Left leg    Type of Debridement:  Non-excisional       Length (cm):  1.5       Width (cm):  1.5       Depth (cm):  1       Area (sq cm):  1.77       Percent Debrided (%):  100       Total Area Debrided (sq cm):  1.77    Depth of debridement:  Epidermis/Dermis    Devitalized tissue debrided:  Biofilm, Callus, Exudate, Fibrin and Slough    Instruments:  Curette (Dermal)  Bleeding:  None  Patient tolerance:  Patient tolerated the procedure well with no immediate complications  Specimen Collected: Specimen sent to microbiology          [] Yes [] No   I & D performed  [] Yes [] No   Excisional debridement performed  [x] Yes [] No   Selective debridement performed           [] Yes [] No   Mechanical debridement performed         [] Yes [] No  Silver nitrate applied                                     [] Yes [] No  Labs ordered this visit                                  [] Yes [] No   Imaging ordered this visit                           [x] Yes [] No   Tissue pathology and/or culture taken             MEDICATIONS  Current Medications[1]   Review of patient's allergies indicates:   Allergen Reactions    Iodine Swelling    Meloxicam Palpitations, Other (See Comments) and Swelling     MOBIC    Penicillins Swelling    Iodinated contrast media  " "      DIAGNOSTICS      Labs/Scans/Micro:    CBC:   Lab Results   Component Value Date    WBC 5.6 05/31/2021    HGB 15.2 05/31/2021    HCT 46.1 05/31/2021    MCV 97 05/31/2021     05/31/2021       Sedimentation rate:   Lab Results   Component Value Date    SEDRATE 2 04/08/2020    C-reactive protein: No results found for: "CRP" Chemistry: [unfilled]  HBA1C: No components found for: "HBA1C"    Ankle Brachial Index: No results found for this or any previous visit.      Imaging:    Plain film: No results found for this or any previous visit.    MRI: No results found for this or any previous visit.   No results found for this or any previous visit.    Bone Scan: No results found for this or any previous visit.   No results found for this or any previous visit.      Vascular studies:      Microbiology: 8/5/25    HOME HEALTH AGENCY:n/a   TIMES PER WEEK/DAYS:    WOUND CARE ORDERS:  Left lower anterior leg:" Cleanse with wound cleanser, apply collagen to wound base, pack gently with 1/4" packing strip, cover with silver alginate and island dressing, wear tubigrip daily      Follow up in 1 week (on 8/12/2025) for left leg.             [1]   Current Outpatient Medications:     albuterol (PROVENTIL) 2.5 mg /3 mL (0.083 %) nebulizer solution, every 4 (four) hours as needed., Disp: , Rfl:     albuterol (PROVENTIL/VENTOLIN HFA) 90 mcg/actuation inhaler, every 4 (four) hours as needed., Disp: , Rfl:     cetirizine (ZYRTEC) 10 MG tablet, Take 10 mg by mouth., Disp: , Rfl:     COSENTYX UNOREADY  mg/2 mL PnIj, every 30 (thirty) days., Disp: , Rfl:     diclofenac (VOLTAREN) 75 MG EC tablet, Take 75 mg by mouth 2 (two) times daily., Disp: , Rfl:     estradioL (ESTRACE) 0.01 % (0.1 mg/gram) vaginal cream, Place 0.5 g vaginally., Disp: , Rfl:     fluticasone propionate (FLONASE) 50 mcg/actuation nasal spray, 2 sprays by Each Nostril route., Disp: , Rfl:     folic acid (FOLVITE) 1 MG tablet, Take 1,000 mcg by mouth., " Disp: , Rfl:     furosemide (LASIX) 20 MG tablet, Take 20 mg by mouth., Disp: , Rfl:     gabapentin (NEURONTIN) 300 MG capsule, Take 600 mg by mouth., Disp: , Rfl:     MOUNJARO 10 mg/0.5 mL PnIj, SMARTSIG:10 Milligram(s) SUB-Q Once a Week, Disp: , Rfl:     solifenacin (VESICARE) 10 MG tablet, Take 10 mg by mouth every morning., Disp: , Rfl:

## 2025-08-05 NOTE — PROCEDURES
"Debridement    Date/Time: 8/5/2025 1:20 PM    Performed by: Cheryl Wagner NP  Authorized by: Cheryl Wagner NP    Time out: Immediately prior to procedure a "time out" was called to verify the correct patient, procedure, equipment, support staff and site/side marked as required.      Preparation: Patient was prepped and draped with aseptic technique    Local anesthesia used?: No      Wound Details:    Location:  Left leg    Type of Debridement:  Non-excisional       Length (cm):  1.5       Width (cm):  1.5       Depth (cm):  1       Area (sq cm):  1.77       Percent Debrided (%):  100       Total Area Debrided (sq cm):  1.77    Depth of debridement:  Epidermis/Dermis    Devitalized tissue debrided:  Biofilm, Callus, Exudate, Fibrin and Slough    Instruments:  Curette (Dermal)  Bleeding:  None  Patient tolerance:  Patient tolerated the procedure well with no immediate complications  Specimen Collected: Specimen sent to microbiology    "

## 2025-08-06 LAB — POCT GLUCOSE: 77 MG/DL (ref 70–110)

## 2025-08-11 ENCOUNTER — HOSPITAL ENCOUNTER (OUTPATIENT)
Dept: RADIOLOGY | Facility: HOSPITAL | Age: 65
Discharge: HOME | End: 2025-08-11
Attending: NURSE PRACTITIONER
Payer: COMMERCIAL

## 2025-08-11 DIAGNOSIS — S81.802A TRAUMATIC OPEN WOUND OF LEFT LOWER LEG, INITIAL ENCOUNTER: ICD-10-CM

## 2025-08-11 DIAGNOSIS — M79.605 LEFT LEG PAIN: ICD-10-CM

## 2025-08-11 DIAGNOSIS — R60.1 GENERALIZED EDEMA: ICD-10-CM

## 2025-08-11 DIAGNOSIS — L97.921 NON-PRESSURE CHRONIC ULCER OF LEFT LOWER LEG, LIMITED TO BREAKDOWN OF SKIN: ICD-10-CM

## 2025-08-11 PROCEDURE — 93926 LOWER EXTREMITY STUDY: CPT | Mod: TC,LT

## 2025-08-11 PROCEDURE — 73590 X-RAY EXAM OF LOWER LEG: CPT | Mod: TC,LT

## 2025-08-11 PROCEDURE — 93971 EXTREMITY STUDY: CPT | Mod: TC,LT

## 2025-08-12 ENCOUNTER — HOSPITAL ENCOUNTER (OUTPATIENT)
Dept: WOUND CARE | Facility: HOSPITAL | Age: 65
Discharge: HOME | End: 2025-08-12
Attending: NURSE PRACTITIONER
Payer: COMMERCIAL

## 2025-08-12 VITALS
BODY MASS INDEX: 33.31 KG/M2 | WEIGHT: 199.94 LBS | SYSTOLIC BLOOD PRESSURE: 142 MMHG | HEIGHT: 65 IN | RESPIRATION RATE: 18 BRPM | HEART RATE: 77 BPM | DIASTOLIC BLOOD PRESSURE: 72 MMHG

## 2025-08-12 DIAGNOSIS — S81.802A TRAUMATIC OPEN WOUND OF LEFT LOWER LEG, INITIAL ENCOUNTER: Primary | ICD-10-CM

## 2025-08-12 PROCEDURE — 99213 OFFICE O/P EST LOW 20 MIN: CPT

## 2025-08-12 PROCEDURE — 27000999 HC MEDICAL RECORD PHOTO DOCUMENTATION

## 2025-08-12 PROCEDURE — 97597 DBRDMT OPN WND 1ST 20 CM/<: CPT

## 2025-08-12 RX ORDER — SULFAMETHOXAZOLE AND TRIMETHOPRIM 800; 160 MG/1; MG/1
1 TABLET ORAL 2 TIMES DAILY
Qty: 20 TABLET | Refills: 0 | Status: SHIPPED | OUTPATIENT
Start: 2025-08-12 | End: 2025-08-22

## 2025-08-12 RX ORDER — MUPIROCIN 20 MG/G
OINTMENT TOPICAL DAILY
Qty: 30 G | Refills: 1 | Status: SHIPPED | OUTPATIENT
Start: 2025-08-12

## 2025-08-26 ENCOUNTER — HOSPITAL ENCOUNTER (OUTPATIENT)
Dept: WOUND CARE | Facility: HOSPITAL | Age: 65
Discharge: HOME | End: 2025-08-26
Attending: NURSE PRACTITIONER
Payer: COMMERCIAL

## 2025-08-26 VITALS
HEART RATE: 75 BPM | WEIGHT: 199.94 LBS | SYSTOLIC BLOOD PRESSURE: 147 MMHG | RESPIRATION RATE: 17 BRPM | DIASTOLIC BLOOD PRESSURE: 79 MMHG | BODY MASS INDEX: 33.31 KG/M2 | HEIGHT: 65 IN

## 2025-08-26 DIAGNOSIS — S81.802A TRAUMATIC OPEN WOUND OF LEFT LOWER LEG, INITIAL ENCOUNTER: Primary | ICD-10-CM

## 2025-08-26 PROCEDURE — 97597 DBRDMT OPN WND 1ST 20 CM/<: CPT

## 2025-08-26 RX ORDER — METHOTREXATE 2.5 MG/1
TABLET ORAL
COMMUNITY
Start: 2025-08-24

## 2025-09-02 ENCOUNTER — HOSPITAL ENCOUNTER (OUTPATIENT)
Dept: WOUND CARE | Facility: HOSPITAL | Age: 65
Discharge: HOME | End: 2025-09-02
Attending: NURSE PRACTITIONER
Payer: COMMERCIAL

## 2025-09-02 VITALS
BODY MASS INDEX: 33.31 KG/M2 | HEIGHT: 65 IN | WEIGHT: 199.94 LBS | DIASTOLIC BLOOD PRESSURE: 84 MMHG | SYSTOLIC BLOOD PRESSURE: 135 MMHG | HEART RATE: 79 BPM

## 2025-09-02 DIAGNOSIS — S81.802A TRAUMATIC OPEN WOUND OF LEFT LOWER LEG, INITIAL ENCOUNTER: Primary | ICD-10-CM

## 2025-09-02 PROCEDURE — 27000999 HC MEDICAL RECORD PHOTO DOCUMENTATION

## 2025-09-02 PROCEDURE — 99212 OFFICE O/P EST SF 10 MIN: CPT

## 2025-09-02 RX ORDER — TIRZEPATIDE 12.5 MG/.5ML
INJECTION, SOLUTION SUBCUTANEOUS
COMMUNITY
Start: 2025-08-26